# Patient Record
(demographics unavailable — no encounter records)

---

## 2017-06-24 NOTE — C.PDOC
History Of Present Illness


Patient is a 33 year old male with a Hx of gastritis who presents to the ER 

with a complaint of epigastric and LUQ pain for the past 2 days. Patient goes 

to the [facility]. Denies vomiting, fever, chills or diarrhea.


Time Seen by Provider: 06/24/17 14:43


Chief Complaint (Nursing): Abdominal Pain


History Per: Patient


History/Exam Limitations: no limitations


Onset/Duration Of Symptoms: Days (2)


Current Symptoms Are (Timing): Still Present


Location Of Pain/Discomfort: Epigastric, LUQ


Radiation Of Pain To:: None


Quality Of Discomfort: Unable To Describe


Associated Symptoms: denies: Fever, Chills, Nausea, Vomiting


Exacerbating Factors: None


Alleviating Factors: None


Recent travel outside of the [place]: No





Past Medical History


Reviewed: Historical Data, Nursing Documentation, Vital Signs


Vital Signs: 


 Last Vital Signs











Temp  98.2 F   06/24/17 17:14


 


Pulse  62   06/24/17 17:14


 


Resp  17   06/24/17 17:14


 


BP  101/63   06/24/17 17:14


 


Pulse Ox  100   06/24/17 17:14














- Medical History


PMH: No Chronic Diseases


Surgical History: Appendectomy





- [facility] Procedures








DIPHTHERIA TOXOID ADMIN (04/09/14)


TETANUS TOXOID ADMINIST (04/09/14)








Family History: States: Unknown Family Hx





- Social History


Hx Tobacco Use: No


Hx Alcohol Use: No


Hx Substance Use: No





- Immunization History


Hx Tetanus Toxoid Vaccination: No


Hx Influenza Vaccination: No


Hx Pneumococcal Vaccination: No





Review Of Systems


Except As Marked, All Systems Reviewed And Found Negative.


Constitutional: Negative for: Fever, Chills


Gastrointestinal: Positive for: Abdominal Pain (Epigastric/LUQ).  Negative for: 

Nausea, Vomiting





Physical Exam





- Physical Exam


Appears: Non-toxic


Skin: Normal Color, Warm, Dry


Head: Atraumatic, Normacephalic


Oral Mucosa: Moist


Chest: Symmetrical, No Tenderness


Cardiovascular: Rhythm Regular, No Murmur


Respiratory: Normal Breath Sounds, No Rales, No Rhonchi, No Wheezing


Gastrointestinal/Abdominal: Soft, Tenderness (Upper abdomen, mainly epigastric/

LUQ)


Neurological/Psych: Oriented x3, Normal Speech, Normal Cognition





ED Course And Treatment





- Laboratory Results


Result Diagrams: 


 06/24/17 15:15





 06/24/17 15:15


Lab Interpretation: Normal


O2 Sat by Pulse Oximetry: 100 (Room air)


Pulse Ox Interpretation: Normal


Progress Note: Blood work and urinalysis ordered. Maalox, pepcid and IV fluids 

administered.  On re-evaluation feeling better, abdomen soft in no distress


Reassessment Condition: Improved





Disposition


Counseled Patient/Family Regarding: Studies Performed, Diagnosis, Need For 

Followup, Rx Given





- Disposition


Referrals: 


[facility] [Outside]


[facility] [Outside]


Disposition: HOME/ ROUTINE


Disposition Time: 17:10


Condition: STABLE


Prescriptions: 


Famotidine [Pepcid AC] 10 mg PO BID #20 tablet


Instructions:  Gastritis (ED), Abdominal Pain (ED)


Print Language: Hungarian





- POA


Present On Arrival: None





- Clinical Impression


Clinical Impression: 


 Abdominal pain, Dyspepsia, Gastritis








- Scribe Statement


The provider has reviewed the documentation as recorded by the Scribmarquis Ramirez





All medical record entries made by the Alyssaibmarquis were at my direction and 

personally dictated by me. I have reviewed the chart and agree that the record 

accurately reflects my personal performance of the history, physical exam, 

medical decision making, and the department course for this patient. I have 

also personally directed, reviewed, and agree with the discharge instructions 

and disposition.

## 2017-12-19 NOTE — C.PDOC
History Of Present Illness


34 y/o male presents to ED with complaints of abdominal cramping for 6 months 

with associated "bubbling" in stomach and passing a lot of gas. Patient has 

multiple prior visit to ED for same symptoms, currently on acid reducer with no 

improvement and states he has GI appointment tomorrow. Patient also complains 

of left lower back pain secondary to heavy lifting yesterday at work. Patient 

denies fever, chills, nausea, vomiting, bowel/bladder incontinence or any other 

complaints at this time. 


Time Seen by Provider: 12/19/17 16:56


Chief Complaint (Nursing): Abdominal Pain


History Per: Patient


History/Exam Limitations: no limitations


Onset/Duration Of Symptoms: Days


Current Symptoms Are (Timing): Still Present





Past Medical History


Reviewed: Historical Data, Nursing Documentation, Vital Signs


Vital Signs: 


 Last Vital Signs











Temp  97.5 F L  12/19/17 16:38


 


Pulse  84   12/19/17 16:38


 


Resp  18   12/19/17 16:38


 


BP  132/77   12/19/17 16:38


 


Pulse Ox  96   12/19/17 18:11














- Medical History


PMH: No Chronic Diseases


Surgical History: Appendectomy





- [facility] Procedures








DIPHTHERIA TOXOID ADMIN (04/09/14)


TETANUS TOXOID ADMINIST (04/09/14)








Family History: States: No Known Family Hx





- Social History


Hx Tobacco Use: No


Hx Alcohol Use: No


Hx Substance Use: No





- Immunization History


Hx Tetanus Toxoid Vaccination: No


Hx Influenza Vaccination: No


Hx Pneumococcal Vaccination: No





Review Of Systems


Constitutional: Negative for: Fever, Chills


Gastrointestinal: Positive for: Abdominal Pain.  Negative for: Nausea, Vomiting


Genitourinary: Negative for: Dysuria, Incontinence


Musculoskeletal: Positive for: Back Pain


Skin: Negative for: Rash


Neurological: Negative for: Weakness, Numbness





Physical Exam





- Physical Exam


Additional Physical Exam Comments: 


Constitutional: No acute distress. WDWN. 


Head: Normocephalic.  Atraumatic.  


Eyes:  PERRL. EOMI. 


ENT:  Moist mucous membranes. 


Neck:  Supple.


Cardiovascular:  Regular rate and rhythm. 


Chest: No tenderness.


Respiratory:  Clear to auscultation bilaterally.


GI:  Soft. Nontender. Nondistended.  Normoactive bowel sounds.  No rebound. No 

guarding.


Back:  Left lumbar tenderness. No midline tenderness


Musculoskeletal:  No tenderness or swelling of extremities.


Skin:  No rash. 


Neurologic:  Alert, no focal deficit. ms and sensation intact. 








ED Course And Treatment


O2 Sat by Pulse Oximetry: 96 (RA)


Pulse Ox Interpretation: Normal





Medical Decision Making


Medical Decision Making: 


Plan: Tylenol ordered 





Patient does not want Tylenol because he states he has "gas" 


Maalox was ordered 





602 pm  pt to be discharged; nsaids not given for back pain due to patient's 

chronic abdominal pain, pt to f/u with gi tomorrow. 





Disposition


Counseled Patient/Family Regarding: Diagnosis, Need For Followup





- Disposition


Referrals: 


[facility] [Outside]


Disposition: HOME/ ROUTINE


Disposition Time: 18:03


Condition: STABLE


Additional Instructions: 





Por favor, vaughn un seguimiento con el gastroenterlogo el da de maana segn 

lo programado. Evite los alimentos que causan gases agus frijoles, brcoli, 

coliflor. Evite levantar objetos pesados; kamila Tylenol para el dolor en la 

espalda.Please follow up with gastroenterologist tomorrow as scheduled. Avoid 

foods that cause gas like beans, broccoli, cauliflower. Avoid heavy lifting; 

take Tylenol for pain in your back.








Instructions:  Simethicone (By mouth), Acute Low Back Pain (ED)


Forms:  Gen Discharge Inst Macedonian, [employer] [facility] (Macedonian)


Print Language: Vietnamese





- Clinical Impression


Clinical Impression: 


 Dyspepsia, Lumbar strain








- PA / NP / Resident Statement


MD/DO has reviewed & agrees with the documentation as recorded.





- Scribe Statement


The provider has reviewed the documentation as recorded by the Abril Loco





All medical record entries made by the Alyssaibmarquis were at my direction and 

personally dictated by me. I have reviewed the chart and agree that the record 

accurately reflects my personal performance of the history, physical exam, 

medical decision making, and the department course for this patient. I have 

also personally directed, reviewed, and agree with the discharge instructions 

and disposition.

## 2018-01-25 NOTE — C.PDOC
History Of Present Illness


patient complaining of rlq abdominal pain, bloating. No f/c/n/v. Dull cramping 

discomfort. tolerating po 


Time Seen by Provider: 01/25/18 02:28


Chief Complaint (Nursing): Abdominal Pain


History Per: Patient


History/Exam Limitations: no limitations


Onset/Duration Of Symptoms: Days (30)


Current Symptoms Are (Timing): Still Present


Context: Other


Severity: Moderate


Pain Scale Rating Of: 4


Location Of Pain/Discomfort: Diffuse


Radiation Of Pain To:: None


Quality Of Discomfort: Dull, Cramping


Associated Symptoms: Vomiting.  denies: Fever, Chills, Nausea


Exacerbating Factors: None


Alleviating Factors: None


Last Bowel Movement: Yesterday


Recent travel outside of the [place]: No


Additional History Per: Patient





Past Medical History


Reviewed: Historical Data, Nursing Documentation, Vital Signs


Vital Signs: 


 Last Vital Signs











Temp  98.7 F   01/25/18 03:59


 


Pulse  60   01/25/18 03:59


 


Resp  14   01/25/18 03:59


 


BP  103/64   01/25/18 03:59


 


Pulse Ox  99   01/25/18 03:59














- Medical History


PMH: 


   Denies: Chronic Kidney Disease


Surgical History: Appendectomy





- [facility] Procedures








DIPHTHERIA TOXOID ADMIN (04/09/14)


TETANUS TOXOID ADMINIST (04/09/14)








Family History: States: No Known Family Hx





- Social History


Hx Tobacco Use: No


Hx Alcohol Use: No


Hx Substance Use: No





- Immunization History


Hx Tetanus Toxoid Vaccination: No


Hx Influenza Vaccination: No


Hx Pneumococcal Vaccination: No





Review Of Systems


Constitutional: Negative for: Fever, Chills


Eyes: Negative for: Redness


Cardiovascular: Negative for: Chest Pain


Respiratory: Negative for: Shortness of Breath


Gastrointestinal: Positive for: Abdominal Pain.  Negative for: Nausea


Genitourinary: Negative for: Dysuria


Musculoskeletal: Negative for: Back Pain


Skin: Negative for: Rash


Neurological: Negative for: Weakness


Psych: Negative for: Anxiety





Physical Exam





- Physical Exam


Appears: Non-toxic, No Acute Distress


Skin: Warm, Dry


Head: Normacephalic


Eye(s): bilateral: Normal Inspection


Oral Mucosa: Moist


Neck: Supple


Chest: Symmetrical


Cardiovascular: Rhythm Regular


Respiratory: No Rales, No Rhonchi, No Wheezing


Gastrointestinal/Abdominal: Bowel Sounds (tympanic to percussion), Soft, 

Tenderness (mild), No Distention, No Guarding, No Rebound


Back: No CVA Tenderness


Extremity: Normal ROM


Extremity: Bilateral: Atraumatic


Neurological/Psych: Oriented x3


Gait: Steady





ED Course And Treatment





- Laboratory Results


Result Diagrams: 


 01/25/18 03:09





 01/25/18 03:09


O2 Sat by Pulse Oximetry: 100


Pulse Ox Interpretation: Normal


Reevaluation Time: 04:55


Reassessment Condition: Improved





Disposition


Counseled Patient/Family Regarding: Studies Performed, Diagnosis, Need For 

Followup, Rx Given





- Disposition


Referrals: 


[facility] [Outside]


[facility] [Outside]


Disposition: HOME/ ROUTINE


Disposition Time: 02:28


Condition: FAIR


Additional Instructions: 


Please return if symptoms recur


Prescriptions: 


Polyethylene Glycol 3350 [Miralax] 17 gm PO DAILY #270 ml


Instructions:  Abdominal Pain (ED), Gas and Bloating (ED), Constipation (DC)


Forms:  [facility] (English)


Print Language: Maori





- Clinical Impression


Clinical Impression: 


 Abdominal bloating, Constipation

## 2018-01-25 NOTE — CT
EXAM:

  CT Abdomen and Pelvis Without Intravenous Contrast



CLINICAL HISTORY:

  34 years old, male; Pain; Abdominal pain; Prior surgery; Surgery type: 

Appendectomy; Patient HX: 9-13-17; Additional info: Rlq pain



TECHNIQUE:

  Axial computed tomography images of the abdomen and pelvis without 

intravenous contrast.  All CT scans at this facility use one or more dose 

reduction techniques, viz.: automated exposure control; ma/kV adjustment per 

patient size (including targeted exams where dose is matched to indication; 

i.e. head); or iterative reconstruction technique.  603 images are submitted.

  Coronal and sagittal reformatted images were created and reviewed.



COMPARISON:

  CT - ABD PELVIS PO   IV CONTRAST 2017-09-13 10:05



FINDINGS:

  Artifacts:  Limited due to motion and misregistration artifacts.

  Lower thorax: No acute findings.



 ABDOMEN:Limitations: Absence of IV contrast decreases sensitivity for 

detecting vascular and visceral injury and abnormality.

  Liver:  Unremarkable.

  Gallbladder and bile ducts:  Contracted gallbladder.

  Pancreas:  Unremarkable.  No ductal dilation.

  Spleen:  Unremarkable.  No splenomegaly.

  Adrenals:  Unremarkable.  No mass.

  Kidneys and ureters:  Unremarkable.  No obstructing stones.  No 

hydronephrosis.

  Stomach and bowel:  Large amount of stool in the colon.  Correlation with 

patient's clinical history of constipation  is recommended.  Diverticulosis.  

There is stool like appearance to the distal small bowel.  This may represent 

slow transit.  No mucosal thickening.

  Appendix:  Appendectomy.



 PELVIS:

  Bladder:  Partially decompressed bladder with bladder wall thickening 

measuring 1.2 cm.Correlation with urinalysis is recommended only if clinical 

cystitis is suspected.

  Reproductive:  Unremarkable.



 ABDOMEN and PELVIS:

  Intraperitoneal space:  Unremarkable.  No free air.  No significant fluid 

collection.

  Bones/joints:  No acute fracture.  No dislocation.

  Soft tissues:  Unremarkable.

  Vasculature:  Pelvic phleboliths.  No abdominal aortic aneurysm.

  Lymph nodes:  Unremarkable.  No enlarged lymph nodes.



IMPRESSION:     

1.  Partially decompressed bladder with bladder wall thickening measuring 1.2 

cm.Correlation with urinalysis is recommended only if clinical cystitis is 

suspected.

2.  Large amount of stool in the colon.   Correlation with patient's clinical 

history of constipation  is recommended.

## 2018-04-10 NOTE — C.PDOC
History Of Present Illness


34 year old male presents to the ED complaining of 8 month history of throat 

pain, upper abdominal pain, and occasional nausea. States he has been evaluated 

in the ER multiple times and given different antacids and pain meds without 

relief. Patient has tried to make appointments for the clinic but has been 

unable to set up appointment. He reports headache from worrying about his 

problem. He states everyone keeps telling him its different things. He has been 

to other hospitals for same complaint. He denies any fever, chills, vomiting, 

diarrhea, recent travel, weight loss.


Time Seen by Provider: 04/10/18 01:27


Chief Complaint (Nursing): ENT Problem


History Per: Patient


History/Exam Limitations: no limitations


Onset/Duration Of Symptoms: Days


Current Symptoms Are (Timing): Still Present





Past Medical History


Reviewed: Historical Data, Nursing Documentation, Vital Signs


Vital Signs: 


 Last Vital Signs











Temp  98.4 F   04/10/18 02:45


 


Pulse  67   04/10/18 02:45


 


Resp  20   04/10/18 02:45


 


BP  107/66   04/10/18 02:45


 


Pulse Ox  100   04/10/18 04:13














- Medical History


PMH: No Chronic Diseases


   Denies: Chronic Kidney Disease


Surgical History: Appendectomy





- [facility] Procedures








DIPHTHERIA TOXOID ADMIN (04/09/14)


TETANUS TOXOID ADMINIST (04/09/14)








Family History: States: Unknown Family Hx





- Social History


Hx Tobacco Use: No


Hx Alcohol Use: No


Hx Substance Use: No





- Immunization History


Hx Tetanus Toxoid Vaccination: No


Hx Influenza Vaccination: No


Hx Pneumococcal Vaccination: No





Review Of Systems


Constitutional: Negative for: Fever


ENT: Positive for: Throat Pain


Gastrointestinal: Positive for: Nausea, Abdominal Pain.  Negative for: Vomiting

, Diarrhea





Physical Exam





- Physical Exam


Appears: Well, Non-toxic, No Acute Distress


Skin: Warm, Dry, No Rash


Head: Atraumatic, Normacephalic, No Tenderness


Eye(s): bilateral: Normal Inspection, PERRL, EOMI


Ear(s): Bilateral: Normal


Nose: Normal, No Flaring, No Discharge


Oral Mucosa: Moist


Throat: No Erythema, No Exudate, Other (post-nasal irritation to the posterior 

oropharynx)


Neck: Normal ROM, Supple, Other (no nodules to thyroid)


Lymphatic: Normal Exam, No Adenopathy


Chest: Symmetrical


Cardiovascular: Rhythm Regular, No Murmur


Respiratory: Normal Breath Sounds, No Rales, No Rhonchi, No Wheezing


Gastrointestinal/Abdominal: Bowel Sounds (active), Soft, No Tenderness, No 

Distention, No Guarding, No Rebound


Back: Normal Inspection, No Vertebral Tenderness


Extremity: Bilateral: Atraumatic, Normal Color And Temperature, Normal ROM


Neurological/Psych: Oriented x3, Normal Speech, No Other (focal deficits)


Gait: Steady





ED Course And Treatment


O2 Sat by Pulse Oximetry: 100 (RA)


Pulse Ox Interpretation: Normal





Medical Decision Making


Medical Decision Making: 


impression: throat and abdominal pain, likely esophageal or gastric related. 

Exam not impressive


Prior records reviewed: Patient seen multiple times in the ED for similar 

presentation with negative work-ups, including negative H.  pylori. Most recent 

CT scan was 1/25/18. Patient instructed to follow up outpatient,  but has not. 





Discussed with patient at length the importance of following up in the clinic 

and with GI specialist for further work-up. There is no clinical indication for 

labwork at this time and he has had prior CT abd/pelvis and US. Patient will be 

discharged home with Carafate prescription. 





Disposition


Counseled Patient/Family Regarding: Diagnosis, Need For Followup, Rx Given





- Disposition


Referrals: 


[facility] [Outside]


[facility] [Outside]


 Service [Outside]


Disposition: HOME/ ROUTINE


Disposition Time: 02:13


Condition: STABLE


Additional Instructions: 


you need to see gastroenterologist for further testing of your symptoms


Take carafate daily and stop other medications


If you are unable to get hold of clinic, go in person to make appointment


try  service to help





necesita chelsea a un gastroenterlogo para realizar ms pruebas de segun sntomas


Freedom carafate diariamente y suspenda otros medicamentos


Si no puede hacerse cargo de la clnica, vaya en persona para programar joel jeff


prueba el servicio de conserjera para ayudar


Prescriptions: 


Sucralfate [Carafate] 1 gm PO DAILY #14 tablet


Instructions:  Gastritis (DC)


Forms:  [employer] (Turkish)


Print Language: Belarusian





- POA


Present On Arrival: None





- Clinical Impression


Clinical Impression: 


 Gastritis, Anxiety about health








- PA / NP / Resident Statement


MD/DO has reviewed & agrees with the documentation as recorded.





- Scribe Statement


The provider has reviewed the documentation as recorded by the Scribe (Mercedez Morrow)





All medical record entries made by the Scribe were at my direction and 

personally dictated by me. I have reviewed the chart and agree that the record 

accurately reflects my personal performance of the history, physical exam, 

medical decision making, and the department course for this patient. I have 

also personally directed, reviewed, and agree with the discharge instructions 

and disposition.

## 2018-06-16 NOTE — CARD
--------------- APPROVED REPORT --------------





EKG Measurement

Heart [id number]

IA [id number]

[id number]

[id number]

[id number]



<Conclusion>

Normal sinus rhythm

Normal ECG

## 2018-12-31 NOTE — RAD
Date of service: 12/31/2018



HISTORY:

 abd pain, constipation 



COMPARISON:

CT abdomen pelvis with contrast performed 4/19/18



FINDINGS:



BOWEL:

Nonobstructive bowel gas pattern.  Moderate to severe constipation.  

No definite free air. 



BONES:

No acute osseous abnormality is detected.



OTHER FINDINGS:

None.



IMPRESSION:

Moderate to severe constipation.

## 2018-12-31 NOTE — C.PDOC
History Of Present Illness


34 year old male presents to the emergency department with complaints of 

abdominal pain for 1 week, associated with constipation for 2 days. Patient 

states that he has been having abdominal pain for 8 months to a year, usually 

within his lower abdomen intermittently, as well as in his epigastric region. 

Patient states that he has been evaluated at [facility] and in the clinic a few times,

with tests done at every visit but "no one can tell him what's wrong with him". 

Patient reports pain despite using medications. 


Time Seen by Provider: 12/31/18 15:19


Chief Complaint (Nursing): GI Problem


History Per: Patient


History/Exam Limitations: no limitations


Onset/Duration Of Symptoms: Other (8 months-year)


Current Symptoms Are (Timing): Still Present


Location Of Pain/Discomfort: Epigastric, Other (lower abdomen)


Quality Of Discomfort: "Pain"


Associated Symptoms: Constipation





Past Medical History


Reviewed: Historical Data, Nursing Documentation, Vital Signs


Vital Signs: 





                                Last Vital Signs











Temp  98.6 F   12/31/18 15:14


 


Pulse  89   12/31/18 15:14


 


Resp  20   12/31/18 15:14


 


BP  131/96 H  12/31/18 15:14


 


Pulse Ox  98   12/31/18 15:14














- Medical History


PMH: Gastritis


   Denies: Chronic Kidney Disease


Surgical History: Appendectomy





- [facility] Procedures











DIPHTHERIA TOXOID ADMIN (04/09/14)


TETANUS TOXOID ADMINIST (04/09/14)








Family History: States: No Known Family Hx





- Social History


Hx Tobacco Use: No


Hx Alcohol Use: No


Hx Substance Use: No





- Immunization History


Hx Tetanus Toxoid Vaccination: No


Hx Influenza Vaccination: No


Hx Pneumococcal Vaccination: No





Review Of Systems


Constitutional: Negative for: Fever, Chills


Gastrointestinal: Positive for: Abdominal Pain, Constipation.  Negative for: 

Nausea, Vomiting





Physical Exam





- Physical Exam


Appears: Well, Non-toxic, No Acute Distress


Skin: Normal Color, Warm, Dry, No Rash


Head: Atraumatic, Normacephalic


Eye(s): bilateral: Normal Inspection, PERRL, EOMI


Oral Mucosa: Moist


Neck: Normal ROM


Chest: Symmetrical, No Tenderness


Cardiovascular: Rhythm Regular, No Murmur


Respiratory: Normal Breath Sounds, No Rales, No Rhonchi, No Wheezing


Gastrointestinal/Abdominal: Bowel Sounds, Soft, Tenderness (mild to bilateral 

lower quadrants ), No Mass, No Distention, No Guarding, No Rebound


Male Genital: Normal Inspection, No Scrotal Swelling, Other (Chaperone: Shaun Kang RN. NO hernia, no penile discharge. )


Extremity: Bilateral: Atraumatic, Normal Color And Temperature, Normal ROM


Neurological/Psych: Oriented x3, Normal Speech


Gait: Steady





ED Course And Treatment


O2 Sat by Pulse Oximetry: 98 (RA)


Pulse Ox Interpretation: Normal





Medical Decision Making


Medical Decision Making: 


Plan:


XR Abdomen (Flat Plate)





XRay viewed by me and shows normal gas pattern and moderate fecal retention. 

Patient advised on dietary changes and to follow up with GI and in the clinic





Disposition


Counseled Patient/Family Regarding: Diagnosis, Need For Followup, Rx Given





- Disposition


Referrals: 


[facility] [Outside]


[id number] [Outside]


Disposition: HOME/ ROUTINE


Disposition Time: 16:25


Condition: GOOD


Additional Instructions: 


Seguimiento en la clnica para mayor atencin.


kamila medicamentos para ayudar con el estreimiento


Prescriptions: 


Docusate [Colace] 100 mg PO TID PRN #30 cap


 PRN Reason: Constipation


Magnesium Citrate [Citrate of Mag] 300 ml PO ONCE PRN #1 bottle


 PRN Reason: Constipation


Instructions:  Constipation, Adult (DC)


Forms:  [facility] (English)


Print Language: Tuvaluan





- POA


Present On Arrival: None





- Clinical Impression


Clinical Impression: 


 Constipation, Abdominal pain








- PA / NP / Resident Statement


MD/DO has reviewed & agrees with the documentation as recorded.





- Scribe Statement


The provider has reviewed the documentation as recorded by the Scribe (Elvis Ahmadi)


All medical record entries made by the Scribe were at my direction and 

personally dictated by me. I have reviewed the chart and agree that the record 

accurately reflects my personal performance of the history, physical exam, 

medical decision making, and the department course for this patient. I have also

personally directed, reviewed, and agree with the discharge instructions and 

disposition.

## 2019-03-02 NOTE — C.PDOC
History Of Present Illness


35 year old male presents to the emergency department with complaints of left 

foot pain after stepping on a samantha nail at work yesterday. Patient states that 

the nail went through his boot, piercing the plantar aspect of his foot. Patient

states that he is not UTD with his tetanus vaccination. 


Time Seen by Provider: 03/01/19 23:05


Chief Complaint (Nursing): Lower Extremity Problem/Injury


History Per: Patient


History/Exam Limitations: no limitations


Onset/Duration Of Symptoms: Days (1)


Current Symptoms Are (Timing): Still Present





- Ankle/Foot


Description Of Injury: Other (pierced with samantha nail)





Past Medical History


Reviewed: Historical Data, Nursing Documentation, Vital Signs


Vital Signs: 





                                Last Vital Signs











Temp  98.2 F   03/01/19 22:53


 


Pulse  80   03/01/19 22:53


 


Resp  16   03/01/19 22:53


 


BP  126/81   03/01/19 22:53


 


Pulse Ox  99   03/01/19 22:53














- Medical History


PMH: Gastritis


   Denies: Chronic Kidney Disease


Surgical History: Appendectomy





- [facility] Procedures











DIPHTHERIA TOXOID ADMIN (04/09/14)


TETANUS TOXOID ADMINIST (04/09/14)








Family History: States: No Known Family Hx





- Social History


Hx Tobacco Use: No


Hx Alcohol Use: No


Hx Substance Use: No





- Immunization History


Hx Tetanus Toxoid Vaccination: No (not up to date as of 03/01/19)


Hx Influenza Vaccination: No


Hx Pneumococcal Vaccination: No





Review Of Systems


Except As Marked, All Systems Reviewed And Found Negative.


Constitutional: Negative for: Fever, Chills


Cardiovascular: Negative for: Chest Pain


Respiratory: Negative for: Cough, Shortness of Breath


Musculoskeletal: Positive for: Foot Pain (left foot)





Physical Exam





- Physical Exam


Appears: Non-toxic, No Acute Distress


Skin: Normal Color, Warm, Dry


Head: Atraumatic, Normacephalic


Eye(s): bilateral: Normal Inspection, PERRL, EOMI


Nose: Normal


Chest: Symmetrical, No Tenderness


Cardiovascular: Rhythm Regular, No Murmur


Respiratory: Normal Breath Sounds, No Rales, No Rhonchi, No Wheezing


Extremity: Normal ROM, Other (puncture wound to the plantar aspect of the left 

foot)


Pulses: Left Dorsalis Pedis: Normal, Right Dorsalis Pedis: Normal


Neurological/Psych: Oriented x3, Normal Speech, Normal Cognition, Normal Motor, 

Normal Sensation, Other (no focal deficit)





ED Course And Treatment


O2 Sat by Pulse Oximetry: 99 (RA)


Pulse Ox Interpretation: Normal


Progress Note: Plan:  Adacel 0.5ml IM.  Cipro 500mg PO.  Motrin 600mg PO





Disposition





- Disposition


Referrals: 


[facility] [Outside]


Disposition: HOME/ ROUTINE


Disposition Time: 00:45


Condition: STABLE


Additional Instructions: 


Follow up in Clinic within 2-3 days. Return to ED immediately if feels worse.


Prescriptions: 


Mupirocin 2% Ointment [Bactroban Ointment] 1 appl TP BID #1 tube


Ciprofloxacin [Cipro] 1 tab PO BID #14 tab


Instructions:  Wound Care


Forms:  [facility] (English)


Print Language: Korean





- Clinical Impression


Clinical Impression: 


 Puncture wound of plantar aspect of foot








- PA / NP / Resident Statement


MD/DO has reviewed & agrees with the documentation as recorded.





- Scribe Statement


The provider has reviewed the documentation as recorded by the Scribe (Elvis Ahmadi)


All medical record entries made by the Scribe were at my direction and 

personally dictated by me. I have reviewed the chart and agree that the record 

accurately reflects my personal performance of the history, physical exam, 

medical decision making, and the department course for this patient. I have also

 personally directed, reviewed, and agree with the discharge instructions and 

disposition.